# Patient Record
Sex: FEMALE | Race: WHITE | NOT HISPANIC OR LATINO | Employment: FULL TIME | URBAN - METROPOLITAN AREA
[De-identification: names, ages, dates, MRNs, and addresses within clinical notes are randomized per-mention and may not be internally consistent; named-entity substitution may affect disease eponyms.]

---

## 2018-01-01 LAB — PAP-ABSTRACT: NORMAL

## 2019-02-27 ENCOUNTER — OFFICE VISIT (OUTPATIENT)
Dept: FAMILY MEDICINE | Facility: CLINIC | Age: 29
End: 2019-02-27
Payer: COMMERCIAL

## 2019-02-27 VITALS
HEIGHT: 62 IN | HEART RATE: 76 BPM | DIASTOLIC BLOOD PRESSURE: 62 MMHG | TEMPERATURE: 98 F | OXYGEN SATURATION: 98 % | BODY MASS INDEX: 21.16 KG/M2 | SYSTOLIC BLOOD PRESSURE: 112 MMHG | WEIGHT: 115 LBS

## 2019-02-27 DIAGNOSIS — Z53.9 DIAGNOSIS NOT YET DEFINED: ICD-10-CM

## 2019-02-27 DIAGNOSIS — J02.9 SORE THROAT: Primary | ICD-10-CM

## 2019-02-27 LAB
DEPRECATED S PYO AG THROAT QL EIA: NORMAL
SPECIMEN SOURCE: NORMAL

## 2019-02-27 PROCEDURE — 87880 STREP A ASSAY W/OPTIC: CPT | Performed by: NURSE PRACTITIONER

## 2019-02-27 PROCEDURE — 87081 CULTURE SCREEN ONLY: CPT | Performed by: NURSE PRACTITIONER

## 2019-02-27 PROCEDURE — 99203 OFFICE O/P NEW LOW 30 MIN: CPT | Performed by: NURSE PRACTITIONER

## 2019-02-27 ASSESSMENT — MIFFLIN-ST. JEOR: SCORE: 1209.89

## 2019-02-27 NOTE — PROGRESS NOTES
"SUBJECTIVE:   Xin Noel is a 28 year old female presenting with a chief complaint of sore throat.   Onset of symptoms was 1 day(s) ago.  Course of illness is same.    Severity moderate  Current and Associated symptoms: cough, runny nose  Treatment measures tried include Tylenol/Ibuprofen, Fluids and Rest.  Predisposing factors include None.    No past medical history on file.  No current outpatient medications on file.     Social History     Tobacco Use     Smoking status: Never Smoker   Substance Use Topics     Alcohol use: Not on file     Family history reviewed, nothing pertinent to visit  Mom alive and well   Dad alive and well    ROS:  CONSTITUTIONAL:NEGATIVE for fever, chills, change in weight  INTEGUMENTARY/SKIN: NEGATIVE for worrisome rashes, moles or lesions  EYES: NEGATIVE for vision changes or irritation  ENT/MOUTH: POSITIVE for sore throat  RESP:NEGATIVE for significant cough or SOB  CV: NEGATIVE for chest pain, palpitations or peripheral edema  GI: NEGATIVE for nausea, abdominal pain, heartburn, or change in bowel habits  : normal menstrual cycles  MUSCULOSKELETAL: NEGATIVE for significant arthralgias or myalgia  NEURO: NEGATIVE for weakness, dizziness or paresthesias  ENDOCRINE: NEGATIVE for temperature intolerance, skin/hair changes  HEME/ALLERGY/IMMUNE: NEGATIVE for bleeding problems  PSYCHIATRIC: NEGATIVE for changes in mood or affect    OBJECTIVE:  /62   Pulse 76   Temp 98  F (36.7  C) (Tympanic)   Ht 1.575 m (5' 2\")   Wt 52.2 kg (115 lb)   SpO2 98%   BMI 21.03 kg/m    GENERAL APPEARANCE: healthy, alert and no distress  EYES: EOMI,  PERRL, conjunctiva clear  HENT: ear canals and TM's normal.  Nose and mouth without ulcers, erythema or lesions  NECK: supple, nontender, no lymphadenopathy  RESP: lungs clear to auscultation - no rales, rhonchi or wheezes  CV: regular rates and rhythm, normal S1 S2, no murmur noted  ABDOMEN:  soft, nontender, no HSM or masses and bowel sounds " normal  NEURO: Normal strength and tone, sensory exam grossly normal,  normal speech and mentation  SKIN: no suspicious lesions or rashes    ASSESSMENT:  (J02.9) Sore throat  (primary encounter diagnosis)    Plan:   Rapid strep negative, culture pending  Home care advised, home treat and monitor symptoms call or rtc if worsening or not improving      (Z53.9) DIAGNOSIS NOT YET DEFINED    Plan: ABSTRACT PAP-NO CHARGE      ROLANDO Fowler CNP

## 2019-02-28 LAB
BACTERIA SPEC CULT: NORMAL
SPECIMEN SOURCE: NORMAL

## 2020-03-11 ENCOUNTER — HEALTH MAINTENANCE LETTER (OUTPATIENT)
Age: 30
End: 2020-03-11

## 2020-12-11 ENCOUNTER — MEDICAL CORRESPONDENCE (OUTPATIENT)
Dept: HEALTH INFORMATION MANAGEMENT | Facility: CLINIC | Age: 30
End: 2020-12-11

## 2020-12-17 ENCOUNTER — VIRTUAL VISIT (OUTPATIENT)
Dept: FAMILY MEDICINE | Facility: CLINIC | Age: 30
End: 2020-12-17
Payer: MEDICAID

## 2020-12-17 DIAGNOSIS — Z11.1 SCREENING EXAMINATION FOR PULMONARY TUBERCULOSIS: Primary | ICD-10-CM

## 2020-12-17 PROCEDURE — 99441 PR PHYSICIAN TELEPHONE EVALUATION 5-10 MIN: CPT | Performed by: PHYSICIAN ASSISTANT

## 2020-12-17 NOTE — PROGRESS NOTES
"Xin Noel is a 30 year old female who is being evaluated via a billable telephone visit.      The patient has been notified of following:     \"This telephone visit will be conducted via a call between you and your physician/provider. We have found that certain health care needs can be provided without the need for a physical exam.  This service lets us provide the care you need with a short phone conversation.  If a prescription is necessary we can send it directly to your pharmacy.  If lab work is needed we can place an order for that and you can then stop by our lab to have the test done at a later time.    Telephone visits are billed at different rates depending on your insurance coverage. During this emergency period, for some insurers they may be billed the same as an in-person visit.  Please reach out to your insurance provider with any questions.    If during the course of the call the physician/provider feels a telephone visit is not appropriate, you will not be charged for this service.\"    Patient has given verbal consent for Telephone visit?  Yes    What phone number would you like to be contacted at? [phone]    How would you like to obtain your AVS? MyChart    Subjective     Xin Noel is a 30 year old female who presents via phone visit today for the following health issues:    HPI       Requesting TB GOLD.          Review of Systems   Constitutional, HEENT, cardiovascular, pulmonary, GI, , musculoskeletal, neuro, skin, endocrine and psych systems are negative, except as otherwise noted.       Objective          Vitals:  No vitals were obtained today due to virtual visit.    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 She was scheduled on 12/23 for a lab only appointment.   Results will be sent via [employer] as soon as they are available.   - Quantiferon TB Gold Plus; Future          No follow-ups on file.    Kristen M. Kehr, PA-C  [facility]    Phone call duration:  5 minutes

## 2020-12-23 DIAGNOSIS — Z11.1 SCREENING EXAMINATION FOR PULMONARY TUBERCULOSIS: ICD-10-CM

## 2020-12-23 PROCEDURE — 36415 COLL VENOUS BLD VENIPUNCTURE: CPT | Performed by: PHYSICIAN ASSISTANT

## 2020-12-23 PROCEDURE — 86481 TB AG RESPONSE T-CELL SUSP: CPT | Performed by: PHYSICIAN ASSISTANT

## 2020-12-24 LAB
GAMMA INTERFERON BACKGROUND BLD IA-ACNC: 0 IU/ML
M TB IFN-G CD4+ BCKGRND COR BLD-ACNC: 10 IU/ML
M TB TUBERC IFN-G BLD QL: NEGATIVE
MITOGEN IGNF BCKGRD COR BLD-ACNC: 0 IU/ML
MITOGEN IGNF BCKGRD COR BLD-ACNC: 0 IU/ML

## 2021-01-28 ENCOUNTER — MEDICAL CORRESPONDENCE (OUTPATIENT)
Dept: HEALTH INFORMATION MANAGEMENT | Facility: CLINIC | Age: 31
End: 2021-01-28

## 2021-03-07 ENCOUNTER — HEALTH MAINTENANCE LETTER (OUTPATIENT)
Age: 31
End: 2021-03-07

## 2021-03-11 ENCOUNTER — MEDICAL CORRESPONDENCE (OUTPATIENT)
Dept: HEALTH INFORMATION MANAGEMENT | Facility: CLINIC | Age: 31
End: 2021-03-11

## 2021-04-25 ENCOUNTER — HEALTH MAINTENANCE LETTER (OUTPATIENT)
Age: 31
End: 2021-04-25

## 2021-05-11 ENCOUNTER — MEDICAL CORRESPONDENCE (OUTPATIENT)
Dept: HEALTH INFORMATION MANAGEMENT | Facility: CLINIC | Age: 31
End: 2021-05-11

## 2021-09-09 ENCOUNTER — OFFICE VISIT (OUTPATIENT)
Dept: URGENT CARE | Facility: URGENT CARE | Age: 31
End: 2021-09-09
Payer: COMMERCIAL

## 2021-09-09 VITALS
OXYGEN SATURATION: 98 % | HEART RATE: 67 BPM | DIASTOLIC BLOOD PRESSURE: 77 MMHG | TEMPERATURE: 97.7 F | SYSTOLIC BLOOD PRESSURE: 126 MMHG

## 2021-09-09 DIAGNOSIS — M79.642 PAIN OF LEFT HAND: Primary | ICD-10-CM

## 2021-09-09 PROCEDURE — 99213 OFFICE O/P EST LOW 20 MIN: CPT | Performed by: NURSE PRACTITIONER

## 2021-09-09 RX ORDER — NAPROXEN 500 MG/1
500 TABLET ORAL 2 TIMES DAILY WITH MEALS
Qty: 14 TABLET | Refills: 0 | Status: SHIPPED | OUTPATIENT
Start: 2021-09-09 | End: 2021-09-16

## 2021-09-10 NOTE — PROGRESS NOTES
SUBJECTIVE:   Xin Noel is a 31 year old female presenting with a chief complaint of   Chief Complaint   Patient presents with     Trauma     Gave a hard high 5. Wrist and pointer finger bone hurts to do anything with it.        She is an established patient of [facility].      SUBJECTIVE:  Xin Noel is a 31 year old female who sustained a left hand injury 2 days ago. Mechanism of injury: She gives someone a high 5 and experienced pain on the left  hand and wrist.. Immediate symptoms: immediate pain, was able to bear weight directly after injury. Symptoms have been slow since that time. Prior history of related problems: no prior problems with this area in the past.      Review of Systems   Musculoskeletal:        Left hand pain   All other systems reviewed and are negative.      No past medical history on file.  No family history on file.  Current Outpatient Medications   Medication Sig Dispense Refill     naproxen (NAPROSYN) 500 MG tablet Take 1 tablet (500 mg) by mouth 2 times daily (with meals) for 7 days 14 tablet 0     Social History     Tobacco Use     Smoking status: Never Smoker   Substance Use Topics     Alcohol use: Not on file       OBJECTIVE  /77   Pulse 67   Temp 97.7  F (36.5  C) (Tympanic)   SpO2 98%     Physical Exam  Vitals and nursing note reviewed.   Constitutional:       General: She is not in acute distress.     Appearance: She is well-developed. She is not diaphoretic.   HENT:      Head: Normocephalic and atraumatic.      Right Ear: Tympanic membrane and external ear normal.      Left Ear: Tympanic membrane and external ear normal.   Eyes:      Pupils: Pupils are equal, round, and reactive to light.   Pulmonary:      Effort: Pulmonary effort is normal. No respiratory distress.      Breath sounds: Normal breath sounds.   Musculoskeletal:      Cervical back: Normal range of motion and neck supple.      Comments: Normal appearance of left hand and wrist no swelling no erythema  or any other deformity.  Tenderness experienced with external rotation of the thumb.  Normal range of motion.  Neurovascular exam is intact.   Lymphadenopathy:      Cervical: No cervical adenopathy.   Skin:     General: Skin is warm and dry.   Neurological:      Mental Status: She is alert.      Cranial Nerves: No cranial nerve deficit.       ASSESSMENT:      ICD-10-CM    1. Pain of left hand  M79.642 WRIST SPLINT     naproxen (NAPROSYN) 500 MG tablet        Medical Decision Making:    Differential Diagnosis:   sprain, fracture, tendonitis, muscle strain, contusion, dislocation and bursitis    PLAN:  Low suspicion of fracture or dislocation.  Therefore no need for x-ray.  Advised to use a splint.  Ice and elevate and use naproxen for pain.  Patient educational/instructional material provided including reasons for follow-up    The patient indicates understanding of these issues and agrees with the plan.      Patient Instructions     Patient Education     Tendonitis and Tenosynovitis  What are tendonitis and tenosynovitis?  Tendons are strong cords of tissue that connect muscles to bones. When a tendon is inflamed, it's called tendonitis. It can happen to any tendon in the body. An inflamed tendon can cause swelling, pain, and discomfort.    Another problem called tenosynovitis is linked to tendonitis. This is the inflammation of the lining of the tendon sheath around a tendon. Often the sheath itself is inflamed, but both the sheath and the tendon can be inflamed at the same time.   Common types of these tendon problems include:    Lateral epicondylitis. This is most often known as tennis elbow. It causes pain to the side of the elbow and forearm, along the thumb side of the arm. The pain is caused by damage to the tendons that bend the wrist back and away from the palm.    Medial epicondylitis. This is most often known as golfer's or baseball elbow. It causes pain from the elbow to the wrist on the palm side of the  "forearm. The pain is caused by damage to the tendons that bend the wrist toward the palm.    Rotator cuff tendonitis. This is a shoulder disorder. It causes inflammation of the shoulder capsule and related tendons.    DeQuervain tenosynovitis. This is a common tenosynovitis disorder. It causes swelling in the tendon sheath of the tendons of the thumb.    Trigger finger or trigger thumb. This is a type of tenosynovitis. The tendon sheath becomes inflamed and thickened. This makes it hard to extend or flex the finger or thumb. The finger or thumb may lock or \"trigger\" suddenly.  What causes tendonitis and tenosynovitis?  The cause of tendonitis and tenosynovitis is often not known. They may be caused by strain, overuse, injury, or too much exercise. They may also be linked to a disease such as diabetes, rheumatoid arthritis, or infection.   What are the symptoms of tendonitis and tenosynovitis?  Symptoms may include:    Pain in the tendon when moved    Swelling from fluid and inflammation    A grating feeling when moving the joint  The symptoms of tendonitis can seem like other health problems. Talk with your healthcare provider for a diagnosis.   How are tendonitis and tenosynovitis diagnosed?  Your healthcare provider will ask about your health history and give you a physical exam. You may have tests to check for other problems that may be causing your symptoms. The tests may include:     Joint aspiration. The healthcare provider uses a needle to take a small amount of fluid from the joint. The fluid is tested to check for gout or signs of an infection.    X-ray. A small amount of radiation is used to make an image. Tendons can t be seen on an X-ray, but they can show bone. This test can check for arthritis, calcifications, and other problems.  How are tendonitis and tenosynovitis treated?  Treatment may include:    Changing your activities    Icing the area to reduce inflammation and pain. To make a cold pack, put " ice cubes in a plastic bag that seals at the top. Wrap the bag in a clean, thin towel or cloth.    Putting a splint on the area to limit movement    Steroid injections to reduce inflammation and pain    Nonsteroidal anti-inflammatory drugs (called NSAIDs) to reduce inflammation and pain    Antibiotics if due to infection    Surgery if other treatments don't work  Key points about tendonitis and tenosynovitis    Tendonitis is when a tendon is inflamed. It can cause swelling, pain, and discomfort.    Another problem called tenosynovitis is linked to tendonitis. This is the inflammation of the lining of the tendon sheath around a tendon.    Common types of tendon problems include rotator cuff tendonitis and trigger finger or trigger thumb.    Tendonitis can be caused by strain, overuse, injury, and too much exercise.    Treatment may include changing your activities, icing the area to reduce pain, and using a splint to limit movement.    Next steps  Tips to help you get the most from a visit to your healthcare provider:     Know the reason for your visit and what you want to happen.    Before your visit, write down questions you want answered.    Bring someone with you to help you ask questions and remember what your provider tells you.    At the visit, write down the name of a new diagnosis, and any new medicines, treatments, or tests. Also write down any new instructions your provider gives you.    Know why a new medicine or treatment is prescribed, and how it will help you. Also know what the side effects are.    Ask if your condition can be treated in other ways.    Know why a test or procedure is recommended and what the results could mean.    Know what to expect if you do not take the medicine or have the test or procedure.    If you have a follow-up appointment, write down the date, time, and purpose for that visit.    Know how you can contact your provider if you have questions.  Shakir last reviewed this  educational content on 1/1/2021 2000-2021 [employer]. All rights reserved. This information is not intended as a substitute for professional medical care. Always follow your healthcare professional's instructions.

## 2021-09-10 NOTE — PATIENT INSTRUCTIONS
"  Patient Education     Tendonitis and Tenosynovitis  What are tendonitis and tenosynovitis?  Tendons are strong cords of tissue that connect muscles to bones. When a tendon is inflamed, it's called tendonitis. It can happen to any tendon in the body. An inflamed tendon can cause swelling, pain, and discomfort.    Another problem called tenosynovitis is linked to tendonitis. This is the inflammation of the lining of the tendon sheath around a tendon. Often the sheath itself is inflamed, but both the sheath and the tendon can be inflamed at the same time.   Common types of these tendon problems include:    Lateral epicondylitis. This is most often known as tennis elbow. It causes pain to the side of the elbow and forearm, along the thumb side of the arm. The pain is caused by damage to the tendons that bend the wrist back and away from the palm.    Medial epicondylitis. This is most often known as golfer's or baseball elbow. It causes pain from the elbow to the wrist on the palm side of the forearm. The pain is caused by damage to the tendons that bend the wrist toward the palm.    Rotator cuff tendonitis. This is a shoulder disorder. It causes inflammation of the shoulder capsule and related tendons.    DeQuervain tenosynovitis. This is a common tenosynovitis disorder. It causes swelling in the tendon sheath of the tendons of the thumb.    Trigger finger or trigger thumb. This is a type of tenosynovitis. The tendon sheath becomes inflamed and thickened. This makes it hard to extend or flex the finger or thumb. The finger or thumb may lock or \"trigger\" suddenly.  What causes tendonitis and tenosynovitis?  The cause of tendonitis and tenosynovitis is often not known. They may be caused by strain, overuse, injury, or too much exercise. They may also be linked to a disease such as diabetes, rheumatoid arthritis, or infection.   What are the symptoms of tendonitis and tenosynovitis?  Symptoms may include:    Pain in the " tendon when moved    Swelling from fluid and inflammation    A grating feeling when moving the joint  The symptoms of tendonitis can seem like other health problems. Talk with your healthcare provider for a diagnosis.   How are tendonitis and tenosynovitis diagnosed?  Your healthcare provider will ask about your health history and give you a physical exam. You may have tests to check for other problems that may be causing your symptoms. The tests may include:     Joint aspiration. The healthcare provider uses a needle to take a small amount of fluid from the joint. The fluid is tested to check for gout or signs of an infection.    X-ray. A small amount of radiation is used to make an image. Tendons can t be seen on an X-ray, but they can show bone. This test can check for arthritis, calcifications, and other problems.  How are tendonitis and tenosynovitis treated?  Treatment may include:    Changing your activities    Icing the area to reduce inflammation and pain. To make a cold pack, put ice cubes in a plastic bag that seals at the top. Wrap the bag in a clean, thin towel or cloth.    Putting a splint on the area to limit movement    Steroid injections to reduce inflammation and pain    Nonsteroidal anti-inflammatory drugs (called NSAIDs) to reduce inflammation and pain    Antibiotics if due to infection    Surgery if other treatments don't work  Key points about tendonitis and tenosynovitis    Tendonitis is when a tendon is inflamed. It can cause swelling, pain, and discomfort.    Another problem called tenosynovitis is linked to tendonitis. This is the inflammation of the lining of the tendon sheath around a tendon.    Common types of tendon problems include rotator cuff tendonitis and trigger finger or trigger thumb.    Tendonitis can be caused by strain, overuse, injury, and too much exercise.    Treatment may include changing your activities, icing the area to reduce pain, and using a splint to limit  movement.    Next steps  Tips to help you get the most from a visit to your healthcare provider:     Know the reason for your visit and what you want to happen.    Before your visit, write down questions you want answered.    Bring someone with you to help you ask questions and remember what your provider tells you.    At the visit, write down the name of a new diagnosis, and any new medicines, treatments, or tests. Also write down any new instructions your provider gives you.    Know why a new medicine or treatment is prescribed, and how it will help you. Also know what the side effects are.    Ask if your condition can be treated in other ways.    Know why a test or procedure is recommended and what the results could mean.    Know what to expect if you do not take the medicine or have the test or procedure.    If you have a follow-up appointment, write down the date, time, and purpose for that visit.    Know how you can contact your provider if you have questions.  Shakir last reviewed this educational content on 1/1/2021 2000-2021 [employer]. All rights reserved. This information is not intended as a substitute for professional medical care. Always follow your healthcare professional's instructions.

## 2021-10-10 ENCOUNTER — HEALTH MAINTENANCE LETTER (OUTPATIENT)
Age: 31
End: 2021-10-10

## 2022-03-14 ENCOUNTER — TRANSFERRED RECORDS (OUTPATIENT)
Dept: HEALTH INFORMATION MANAGEMENT | Facility: CLINIC | Age: 32
End: 2022-03-14
Payer: COMMERCIAL

## 2022-03-21 ENCOUNTER — MEDICAL CORRESPONDENCE (OUTPATIENT)
Dept: HEALTH INFORMATION MANAGEMENT | Facility: CLINIC | Age: 32
End: 2022-03-21
Payer: COMMERCIAL

## 2022-04-04 ENCOUNTER — VIRTUAL VISIT (OUTPATIENT)
Dept: FAMILY MEDICINE | Facility: CLINIC | Age: 32
End: 2022-04-04
Payer: COMMERCIAL

## 2022-04-04 DIAGNOSIS — Z11.1 SCREENING EXAMINATION FOR PULMONARY TUBERCULOSIS: Primary | ICD-10-CM

## 2022-04-04 PROCEDURE — 99212 OFFICE O/P EST SF 10 MIN: CPT | Mod: 95 | Performed by: PHYSICIAN ASSISTANT

## 2022-04-04 NOTE — PATIENT INSTRUCTIONS
Leann Lauren,    Thank you for allowing [facility] to manage your care.    I ordered some lab work. Call the [facility] location of your choice to schedule a lab appointment or do it through [email or web address].    Please allow 1-2 business days for our office to contact you in regards to your laboratory/radiological studies.  If not done so, I encourage you to login into PWA [email or web address] to review your results as well.     If you have any questions or concerns, please feel free to call us at [phone]    Sincerely,    Adam White PA-C    Did you know?      You can schedule a video visit for follow-up appointments as well as future appointments for certain conditions.  Please see the below link.     [email or web address]    If you have not already done so,  I encourage you to sign up for PWA ([email or web address]).  This will allow you to review your results, securely communicate with a provider, and schedule virtual visits as well.      Patient Education     TB Screening (Whole Blood)   Does this test have other names?  Interferon-gamma release assay (IGRA), QuantiFERON test, T-spot, T-SPOT   What is this test?  This test shows if you have been infected with tuberculosis (TB). TB is a very contagious bacterial infection that is spread through the air. It's possible to have inactive (latent) TB and not feel sick or have noticeable symptoms. Or you can have active TB disease with symptoms. People with latent TB are not contagious.   This test is more accurate and more specific than skin tests for TB. Results are ready in 24 hours. Also, you can have this screening test if you have been vaccinated against TB. The TB vaccine is called bacille Calmette-Bridgette (BCG). The skin test is not advised if you've been vaccinated. The skin test can lead to a false positive test result in people who have had the TB vaccine. The vaccine will  not lead to a false positive IGRA test result.   There are 2 whole blood TB tests known as interferon gamma release assays (IGRAs) that have been approved by the FDA and are available in the [place] for TB screening. They are:     QuantiFERON TB Gold In-Tube test (QFT-GIT)    T-SPOT TB test (T-Spot)  Why do I need this test?  You may need this test if you have recently been exposed to someone who has TB, or if your healthcare provider thinks you may have a TB infection.   Symptoms of TB may include:    Cough    Fever    Night sweats    Unexplained weight loss     Coughing up blood    Chest pain    Fatigue    Shortness of breath  TB usually affects the lungs. But it can spread to other parts of your body, such as your joints, spine, brain, and kidneys, and cause more symptoms.   You also may have this test if you:    Have HIV or another disease that weakens your immune system    Use illegal drugs    Live or work in a place with a higher rate of TB infection, such as a residential or nursing home    Need to start a medicine that suppresses your immune system    Recently emigrated from areas where TB is more common, such as some [place]  or  countries    Are a healthcare worker and need this test as part of your facility's infection control program   What other tests might I have along with this test?  If you test positive for TB, you will likely also need a chest X-ray, sputum smear, and TB culture to find out if you have active or latent TB.    You may also be tested for HIV after a positive TB test.  What do my test results mean?  Test results may vary depending on your age, gender, health history, the method used for the test, and other things. Your test results may not mean you have a problem. Ask your healthcare provider what your test results mean for you.   A positive IGRA test means you may have 1 of 2 types of TB:    Inactive (latent) TB. This means TB bacteria are present in your body but are  not active and are not causing symptoms. You are not contagious, although it's possible for you to develop TB in the future.    Active TB or TB disease. This means TB bacteria are active in your body, and you are contagious.  A negative test means that a TB infection (either active or inactive) is unlikely. But you may get negative results if you have very advanced TB. This is because in later stages the disease can suppress the immune reaction, causing the IGRA test to be positive.   How is this test done?  The test is done with a blood sample. A needle is used to draw blood from a vein in your arm or hand.   Does this test pose any risks?  Having a blood test with a needle carries some risks. These include bleeding, infection, bruising, and feeling lightheaded. When the needle pricks your arm or hand, you may feel a slight sting or pain. Afterward, the site may be sore.   What might affect my test results?  Your test results may be affected if you have tumors that require treatment with medicines that suppress your immune system. Your results may also be affected if you have HIV, AIDS, or another blood disorder.   How do I get ready for this test?  You don't need to prepare for this test.  Be sure your healthcare provider knows about all medicines, herbs, vitamins, and supplements you are taking. This includes medicines that don't need a prescription and any illegal drugs you may use.    Shakir last reviewed this educational content on 11/1/2020 2000-2021 [employer]. All rights reserved. This information is not intended as a substitute for professional medical care. Always follow your healthcare professional's instructions.

## 2022-04-04 NOTE — PROGRESS NOTES
Xin is a 32 year old who is being evaluated via a billable telephone visit.      What phone number would you like to be contacted at? [phone]  How would you like to obtain your AVS? [facility]    Assessment & Plan   Problem List Items Addressed This Visit     None      Visit Diagnoses     Screening examination for pulmonary tuberculosis    -  Primary    Relevant Orders    Quantiferon TB Gold Plus         Screening test for TB pending. No symptoms or history of TB infection.    7 minutes spent on the date of the encounter doing chart review, history and exam, documentation and further activities per the note     See Patient Instructions    Return for a recheck as needed.    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 Had a negative test late last year, but needs to have a more recent one to begin clinicals. Feels well and has no symptoms. No history of TB.    Review of Systems   Constitutional, HEENT, cardiovascular, pulmonary, gi and gu systems are negative, except as otherwise noted.      Objective           Vitals:  No vitals were obtained today due to virtual visit.    Physical Exam   healthy, alert and no distress  PSYCH: Alert and oriented times 3; coherent speech, normal   rate and volume, able to articulate logical thoughts, able   to abstract reason, no tangential thoughts, no hallucinations   or delusions  Her affect is normal and pleasant  RESP: No cough, no audible wheezing, able to talk in full sentences  Remainder of exam unable to be completed due to telephone visits    quantiferon gold test pending.     Phone call duration: 3 minutes

## 2022-04-05 ENCOUNTER — LAB (OUTPATIENT)
Dept: LAB | Facility: CLINIC | Age: 32
End: 2022-04-05
Payer: COMMERCIAL

## 2022-04-05 DIAGNOSIS — Z11.1 SCREENING EXAMINATION FOR PULMONARY TUBERCULOSIS: ICD-10-CM

## 2022-04-05 PROCEDURE — 86481 TB AG RESPONSE T-CELL SUSP: CPT

## 2022-04-05 PROCEDURE — 36415 COLL VENOUS BLD VENIPUNCTURE: CPT

## 2022-04-07 DIAGNOSIS — Z11.1 SCREENING EXAMINATION FOR PULMONARY TUBERCULOSIS: Primary | ICD-10-CM

## 2022-04-07 LAB
GAMMA INTERFERON BACKGROUND BLD IA-ACNC: 0.02 IU/ML
M TB IFN-G BLD-IMP: ABNORMAL
M TB IFN-G CD4+ BCKGRND COR BLD-ACNC: 0.29 IU/ML
MITOGEN IGNF BCKGRD COR BLD-ACNC: 0 IU/ML
MITOGEN IGNF BCKGRD COR BLD-ACNC: 0.01 IU/ML
QUANTIFERON MITOGEN: 0.31 IU/ML
QUANTIFERON NIL TUBE: 0.02 IU/ML
QUANTIFERON TB1 TUBE: 0.03 IU/ML
QUANTIFERON TB2 TUBE: 0.02

## 2022-04-08 ENCOUNTER — LAB (OUTPATIENT)
Dept: LAB | Facility: CLINIC | Age: 32
End: 2022-04-08
Payer: COMMERCIAL

## 2022-04-08 DIAGNOSIS — Z11.1 SCREENING EXAMINATION FOR PULMONARY TUBERCULOSIS: ICD-10-CM

## 2022-04-08 PROCEDURE — 86481 TB AG RESPONSE T-CELL SUSP: CPT

## 2022-04-08 PROCEDURE — 36415 COLL VENOUS BLD VENIPUNCTURE: CPT

## 2022-04-11 LAB
GAMMA INTERFERON BACKGROUND BLD IA-ACNC: 0.01 IU/ML
M TB IFN-G BLD-IMP: NEGATIVE
M TB IFN-G CD4+ BCKGRND COR BLD-ACNC: 0.73 IU/ML
MITOGEN IGNF BCKGRD COR BLD-ACNC: 0 IU/ML
MITOGEN IGNF BCKGRD COR BLD-ACNC: 0.01 IU/ML
QUANTIFERON MITOGEN: 0.74 IU/ML
QUANTIFERON NIL TUBE: 0.01 IU/ML
QUANTIFERON TB1 TUBE: 0.02 IU/ML
QUANTIFERON TB2 TUBE: 0.01

## 2022-04-18 ENCOUNTER — TRANSCRIBE ORDERS (OUTPATIENT)
Dept: CARDIOLOGY | Facility: CLINIC | Age: 32
End: 2022-04-18
Payer: COMMERCIAL

## 2022-05-04 ENCOUNTER — TRANSCRIBE ORDERS (OUTPATIENT)
Dept: CARDIOLOGY | Facility: CLINIC | Age: 32
End: 2022-05-04
Payer: COMMERCIAL

## 2022-05-04 DIAGNOSIS — O30.039 MONOCHORIONIC DIAMNIOTIC TWIN GESTATION: Primary | ICD-10-CM

## 2022-05-05 ENCOUNTER — HOSPITAL ENCOUNTER (OUTPATIENT)
Dept: CARDIOLOGY | Facility: CLINIC | Age: 32
Discharge: HOME | End: 2022-05-05
Payer: COMMERCIAL

## 2022-05-05 ENCOUNTER — OFFICE VISIT (OUTPATIENT)
Dept: CARDIOLOGY | Facility: CLINIC | Age: 32
End: 2022-05-05
Payer: COMMERCIAL

## 2022-05-05 DIAGNOSIS — O30.039 MONOCHORIONIC DIAMNIOTIC TWIN GESTATION: ICD-10-CM

## 2022-05-05 DIAGNOSIS — O30.039 TWIN GESTATION, MONOCHORIONIC DIAMNIOTIC: ICD-10-CM

## 2022-05-05 DIAGNOSIS — O30.032 MONOCHORIONIC DIAMNIOTIC TWIN GESTATION IN SECOND TRIMESTER: Primary | ICD-10-CM

## 2022-05-05 PROCEDURE — 76827 ECHO EXAM OF FETAL HEART: CPT | Mod: 26 | Performed by: PEDIATRICS

## 2022-05-05 PROCEDURE — 93325 DOPPLER ECHO COLOR FLOW MAPG: CPT

## 2022-05-05 PROCEDURE — 99203 OFFICE O/P NEW LOW 30 MIN: CPT | Mod: 25 | Performed by: PEDIATRICS

## 2022-05-05 PROCEDURE — 76825 ECHO EXAM OF FETAL HEART: CPT | Mod: 26 | Performed by: PEDIATRICS

## 2022-05-05 PROCEDURE — 93325 DOPPLER ECHO COLOR FLOW MAPG: CPT | Mod: 26 | Performed by: PEDIATRICS

## 2022-05-05 NOTE — PROGRESS NOTES
Fetal Cardiology Consultation    Patient:  Xin Noel MRN:  [id number]   YOB: 1990 Age:  32 year old   Date of Visit:  5/5/2022 PCP:  Jennifer Whitaker APRN CNM   MICHAEL: 9/5/22 EGA: 22+3 weeks     Dear Doctor:    I had the pleasure of seeing Xin Noel at the [facility] in [place] on 5/5/2022 in consultation for fetal echocardiography results. She presented today by herself. As you know, she is a 32 year old female with multiple gestation (monochorionic diamniotic twins).    In both twins, the fetal echocardiogram was normal. Normal fetal cardiac anatomy. Normal right and left ventricular size and function without hypertrophy. No evidence of diastolic dysfunction. No pericardial effusion. No arrhythmia.     I reviewed and interpreted the fetal echocardiogram today. I discussed the normal results with Ms. Noel. While these results are normal, it is important to note that fetal echocardiography cannot exclude small atrial or ventricular septal defects, persistent ductus arteriosus, mild coarctation of the aorta, partial anomalous pulmonary venous return, minor anatomic valve anomalies, or coronary artery anomalies.     Thank you for allowing me to participate in Ms. Noel's care. Please don't hesitate to contact me or the Fetal Cardiology team at [facility] with any questions or concerns, or with evolving TTTS.    I spent a total of 15 minutes on the date of the encounter doing chart review, patient history, documentation, counseling, and coordinating care.    Az Cotto MD  Pediatric Cardiology  [facility]  Phone [phone]    Review of prior external note(s) from - Outside records from [facility]  Review of the result(s) of each unique test - fetal echocardiogram x2

## 2022-05-21 ENCOUNTER — HEALTH MAINTENANCE LETTER (OUTPATIENT)
Age: 32
End: 2022-05-21

## 2022-09-18 ENCOUNTER — HEALTH MAINTENANCE LETTER (OUTPATIENT)
Age: 32
End: 2022-09-18

## 2023-01-06 ENCOUNTER — E-VISIT (OUTPATIENT)
Dept: URGENT CARE | Facility: CLINIC | Age: 33
End: 2023-01-06
Payer: COMMERCIAL

## 2023-01-06 DIAGNOSIS — B96.89 ACUTE BACTERIAL SINUSITIS: Primary | ICD-10-CM

## 2023-01-06 DIAGNOSIS — J01.90 ACUTE BACTERIAL SINUSITIS: Primary | ICD-10-CM

## 2023-01-06 PROCEDURE — 99421 OL DIG E/M SVC 5-10 MIN: CPT | Performed by: PHYSICIAN ASSISTANT

## 2023-01-06 RX ORDER — DOXYCYCLINE HYCLATE 100 MG
100 TABLET ORAL 2 TIMES DAILY
Qty: 14 TABLET | Refills: 0 | Status: SHIPPED | OUTPATIENT
Start: 2023-01-06 | End: 2023-01-13

## 2023-01-06 NOTE — PATIENT INSTRUCTIONS
Dear Xin Noel    After reviewing your responses, I've been able to diagnose you with Acute bacterial sinusitis.      Based on your responses and diagnosis, I have prescribed No orders of the defined types were placed in this encounter.   to treat your symptoms. I have sent this to your pharmacy.?     It is also important to stay well hydrated, get lots of rest and take over-the-counter decongestants (pseudoephedrine works best and is available behind the pharmacy counter),?tylenol?or ibuprofen if you?are able to?take those medications per your primary care provider to help relieve discomfort.?     It is important that you take?all of?your prescribed medication even if your symptoms are improving after a few doses.? Taking?all of?your medicine helps prevent the symptoms from returning.?     If your symptoms worsen, you develop severe headache, vomiting, high fever (>102), or are not improving in 7 days, please contact your primary care provider for an appointment or visit any of our convenient Walk-in Care or Urgent Care Centers to be seen which can be found on our website?here.?     Thanks again for choosing?us?as your health care partner,?   ?  Michelle Denise PA-C?

## 2023-02-20 ENCOUNTER — MEDICAL CORRESPONDENCE (OUTPATIENT)
Dept: HEALTH INFORMATION MANAGEMENT | Facility: CLINIC | Age: 33
End: 2023-02-20

## 2023-06-04 ENCOUNTER — HEALTH MAINTENANCE LETTER (OUTPATIENT)
Age: 33
End: 2023-06-04

## 2024-07-14 ENCOUNTER — HEALTH MAINTENANCE LETTER (OUTPATIENT)
Age: 34
End: 2024-07-14

## 2024-10-25 DIAGNOSIS — Z13.9 SCREENING FOR UNSPECIFIED CONDITION: Primary | ICD-10-CM

## 2024-10-26 ENCOUNTER — LAB (OUTPATIENT)
Dept: LAB | Facility: CLINIC | Age: 34
End: 2024-10-26
Payer: COMMERCIAL

## 2024-10-26 DIAGNOSIS — Z13.9 SCREENING FOR UNSPECIFIED CONDITION: ICD-10-CM

## 2024-10-26 LAB — HBV SURFACE AG SERPL QL IA: NONREACTIVE

## 2024-10-26 PROCEDURE — 36415 COLL VENOUS BLD VENIPUNCTURE: CPT

## 2024-10-26 PROCEDURE — 86481 TB AG RESPONSE T-CELL SUSP: CPT

## 2024-10-26 PROCEDURE — 87340 HEPATITIS B SURFACE AG IA: CPT

## 2024-10-28 LAB
GAMMA INTERFERON BACKGROUND BLD IA-ACNC: 0.01 IU/ML
M TB IFN-G BLD-IMP: NEGATIVE
M TB IFN-G CD4+ BCKGRND COR BLD-ACNC: 9.99 IU/ML
MITOGEN IGNF BCKGRD COR BLD-ACNC: 0 IU/ML
MITOGEN IGNF BCKGRD COR BLD-ACNC: 0.01 IU/ML
QUANTIFERON MITOGEN: 10 IU/ML
QUANTIFERON NIL TUBE: 0.01 IU/ML
QUANTIFERON TB1 TUBE: 0.02 IU/ML
QUANTIFERON TB2 TUBE: 0.01